# Patient Record
Sex: FEMALE | Race: WHITE | NOT HISPANIC OR LATINO | URBAN - METROPOLITAN AREA
[De-identification: names, ages, dates, MRNs, and addresses within clinical notes are randomized per-mention and may not be internally consistent; named-entity substitution may affect disease eponyms.]

---

## 2021-10-05 ENCOUNTER — TELEPHONE ENCOUNTER (OUTPATIENT)
Dept: URBAN - METROPOLITAN AREA CLINIC 92 | Facility: CLINIC | Age: 86
End: 2021-10-05

## 2021-10-07 ENCOUNTER — OFFICE VISIT (OUTPATIENT)
Dept: URBAN - METROPOLITAN AREA CLINIC 12 | Facility: CLINIC | Age: 86
End: 2021-10-07

## 2023-10-10 ENCOUNTER — LAB OUTSIDE AN ENCOUNTER (OUTPATIENT)
Dept: URBAN - METROPOLITAN AREA CLINIC 23 | Facility: CLINIC | Age: 88
End: 2023-10-10

## 2023-10-10 ENCOUNTER — OFFICE VISIT (OUTPATIENT)
Dept: URBAN - METROPOLITAN AREA CLINIC 23 | Facility: CLINIC | Age: 88
End: 2023-10-10
Payer: COMMERCIAL

## 2023-10-10 VITALS
HEIGHT: 64 IN | BODY MASS INDEX: 19.29 KG/M2 | TEMPERATURE: 98.3 F | HEART RATE: 67 BPM | DIASTOLIC BLOOD PRESSURE: 87 MMHG | WEIGHT: 113 LBS | SYSTOLIC BLOOD PRESSURE: 150 MMHG

## 2023-10-10 DIAGNOSIS — R19.5 POSITIVE COLORECTAL CANCER SCREENING USING DNA-BASED STOOL TEST: ICD-10-CM

## 2023-10-10 DIAGNOSIS — R63.4 ABNORMAL WEIGHT LOSS: ICD-10-CM

## 2023-10-10 DIAGNOSIS — R19.4 CHANGE IN BOWEL HABIT: ICD-10-CM

## 2023-10-10 PROCEDURE — 99204 OFFICE O/P NEW MOD 45 MIN: CPT | Performed by: INTERNAL MEDICINE

## 2023-10-10 RX ORDER — METHOCARBAMOL 500 MG/1
1.5 TABLETS TABLET ORAL
Status: ACTIVE | COMMUNITY

## 2023-10-10 RX ORDER — LEVOTHYROXINE SODIUM 50 UG/1
1 TABLET IN THE MORNING ON AN EMPTY STOMACH TABLET ORAL ONCE A DAY
Status: ACTIVE | COMMUNITY

## 2023-10-10 RX ORDER — ATENOLOL 25 MG/1
1 TABLET TABLET ORAL ONCE A DAY
Status: ACTIVE | COMMUNITY

## 2023-10-10 RX ORDER — MELOXICAM 15 MG/1
1 TABLET TABLET ORAL ONCE A DAY
Status: ACTIVE | COMMUNITY

## 2023-10-10 RX ORDER — AMLODIPINE BESYLATE 5 MG/1
1 TABLET TABLET ORAL ONCE A DAY
Status: ACTIVE | COMMUNITY

## 2023-10-10 NOTE — HPI-TODAY'S VISIT:
88-year-old female presented today for evaluation of abnormal weight loss change in the bowel habit and positive Cologuard test she  reports recent weight loss, constipation, bloating, and a feeling of something in the bowel. She has been taking laxatives and has noticed an increase in the dose required over the past year. She has a family history of colon cancer and has had a positive Cologuard test. The patient denies any anemia or low blood count.she has had a hysterectomy in the past. She occasionally experiences pain on the left side of her abdomen but not consistently. She has undergone various tests for heart disease and reports no history of heart attack or other heart-related issues.

## 2023-10-10 NOTE — PHYSICAL EXAM EYES:
Conjuntivae and eyelids appear normal, Sclerae : White without injection Prescription approved per [facility] Refill Protocol.    Selena Bates RN  [facility]

## 2023-10-11 ENCOUNTER — TELEPHONE ENCOUNTER (OUTPATIENT)
Dept: URBAN - METROPOLITAN AREA CLINIC 23 | Facility: CLINIC | Age: 88
End: 2023-10-11

## 2023-10-23 ENCOUNTER — TELEPHONE ENCOUNTER (OUTPATIENT)
Dept: URBAN - METROPOLITAN AREA CLINIC 23 | Facility: CLINIC | Age: 88
End: 2023-10-23

## 2023-11-03 ENCOUNTER — OFFICE VISIT (OUTPATIENT)
Dept: URBAN - METROPOLITAN AREA MEDICAL CENTER 27 | Facility: MEDICAL CENTER | Age: 88
End: 2023-11-03
Payer: COMMERCIAL

## 2023-11-03 DIAGNOSIS — K63.89 TORTUOUS COLON: ICD-10-CM

## 2023-11-03 DIAGNOSIS — R19.4 ALTERATION IN BOWEL ELIMINATION: ICD-10-CM

## 2023-11-03 DIAGNOSIS — R19.5 ABNORMAL CONSISTENCY OF STOOL: ICD-10-CM

## 2023-11-03 PROCEDURE — 45378 DIAGNOSTIC COLONOSCOPY: CPT | Performed by: INTERNAL MEDICINE

## 2023-11-03 RX ORDER — MELOXICAM 15 MG/1
1 TABLET TABLET ORAL ONCE A DAY
Status: ACTIVE | COMMUNITY

## 2023-11-03 RX ORDER — AMLODIPINE BESYLATE 5 MG/1
1 TABLET TABLET ORAL ONCE A DAY
Status: ACTIVE | COMMUNITY

## 2023-11-03 RX ORDER — METHOCARBAMOL 500 MG/1
1.5 TABLETS TABLET ORAL
Status: ACTIVE | COMMUNITY

## 2023-11-03 RX ORDER — LEVOTHYROXINE SODIUM 50 UG/1
1 TABLET IN THE MORNING ON AN EMPTY STOMACH TABLET ORAL ONCE A DAY
Status: ACTIVE | COMMUNITY

## 2023-11-03 RX ORDER — ATENOLOL 25 MG/1
1 TABLET TABLET ORAL ONCE A DAY
Status: ACTIVE | COMMUNITY

## 2024-06-26 ENCOUNTER — LAB OUTSIDE AN ENCOUNTER (OUTPATIENT)
Dept: URBAN - METROPOLITAN AREA CLINIC 111 | Facility: CLINIC | Age: 89
End: 2024-06-26

## 2024-06-26 ENCOUNTER — DASHBOARD ENCOUNTERS (OUTPATIENT)
Age: 89
End: 2024-06-26

## 2024-06-26 ENCOUNTER — OFFICE VISIT (OUTPATIENT)
Dept: URBAN - METROPOLITAN AREA CLINIC 111 | Facility: CLINIC | Age: 89
End: 2024-06-26
Payer: COMMERCIAL

## 2024-06-26 VITALS
SYSTOLIC BLOOD PRESSURE: 151 MMHG | WEIGHT: 106 LBS | HEART RATE: 73 BPM | HEIGHT: 62 IN | TEMPERATURE: 97.5 F | DIASTOLIC BLOOD PRESSURE: 81 MMHG | BODY MASS INDEX: 19.51 KG/M2

## 2024-06-26 DIAGNOSIS — M54.6 PAIN IN THORACIC SPINE: ICD-10-CM

## 2024-06-26 DIAGNOSIS — R63.4 ABNORMAL WEIGHT LOSS: ICD-10-CM

## 2024-06-26 DIAGNOSIS — G89.29 OTHER CHRONIC PAIN: ICD-10-CM

## 2024-06-26 DIAGNOSIS — R68.81 EARLY SATIETY: ICD-10-CM

## 2024-06-26 PROCEDURE — 99214 OFFICE O/P EST MOD 30 MIN: CPT | Performed by: INTERNAL MEDICINE

## 2024-06-26 RX ORDER — ATORVASTATIN CALCIUM 20 MG/1
1 TABLET TABLET, FILM COATED ORAL ONCE A DAY
Status: ACTIVE | COMMUNITY

## 2024-06-26 RX ORDER — LEVOTHYROXINE SODIUM 50 UG/1
1 TABLET IN THE MORNING ON AN EMPTY STOMACH TABLET ORAL ONCE A DAY
Status: ACTIVE | COMMUNITY

## 2024-06-26 RX ORDER — METHOCARBAMOL 500 MG/1
1.5 TABLETS TABLET ORAL
Status: ACTIVE | COMMUNITY

## 2024-06-26 RX ORDER — AMLODIPINE BESYLATE 5 MG/1
1 TABLET TABLET ORAL ONCE A DAY
Status: ACTIVE | COMMUNITY

## 2024-06-26 RX ORDER — CLONIDINE 0.1 MG/D
1 PATCH TO SKIN PATCH TRANSDERMAL
Status: ACTIVE | COMMUNITY

## 2024-06-26 RX ORDER — GABAPENTIN 100 MG/1
1 CAPSULE CAPSULE ORAL ONCE A DAY
Status: ACTIVE | COMMUNITY

## 2024-06-26 RX ORDER — ATENOLOL 25 MG/1
1 TABLET TABLET ORAL ONCE A DAY
Status: ACTIVE | COMMUNITY

## 2024-06-26 RX ORDER — MELOXICAM 15 MG/1
1 TABLET TABLET ORAL ONCE A DAY
Status: ON HOLD | COMMUNITY

## 2024-06-26 NOTE — HPI-TODAY'S VISIT:
89-year-old female presented today for evaluation of abnormal weight loss change  Her main complaint was progressive back pain which is a chronic but recently worsened associated with the weight loss she had PET scan showed some sclerosis abnormality in the ribs on the back she was seen by oncology had extensive workup was told no cancer still no clear cause for her weight loss.  She had Cologuard positive test and she had a colonoscopy November 2023 which was unremarkable except for tortuous colon.  She continued to lose weight loss of appetite and back pain she describes the pain as a stabbing pain worse with the movement. All her labs were normal   she had  significant weight loss, with the patient's weight dropping from 120 lbs to 104 lbs despite efforts to maintain weight. She reports a decreased appetite but continues to consume three meals daily, supplementing her diet with protein bars and drinks to address low protein levels.  The patient's procedural history includes a recent colonoscopy, which showed no abnormalities. Additional diagnostic tests, such as a PET scan and CT scan, were performed and did not reveal any masses or cancer. However, an area of bone abnormality was noted in the second rib, though no definitive cause for the pain was identified. Her blood tests, including liver function tests, are within normal limits.  She also experiences nausea and occasional feelings of needing to vomit but has not actually vomited. She reports no issues with food getting stuck and is able to eat without difficulty.

## 2024-06-27 PROBLEM — 82423001: Status: ACTIVE | Noted: 2024-06-27

## 2024-07-23 ENCOUNTER — OFFICE VISIT (OUTPATIENT)
Dept: URBAN - METROPOLITAN AREA MEDICAL CENTER 27 | Facility: MEDICAL CENTER | Age: 89
End: 2024-07-23
Payer: COMMERCIAL

## 2024-07-23 DIAGNOSIS — K31.89 OTHER DISEASES OF STOMACH AND DUODENUM: ICD-10-CM

## 2024-07-23 DIAGNOSIS — K29.60 ADENOPAPILLOMATOSIS GASTRICA: ICD-10-CM

## 2024-07-23 PROCEDURE — 43239 EGD BIOPSY SINGLE/MULTIPLE: CPT | Performed by: INTERNAL MEDICINE

## 2024-07-23 RX ORDER — CLONIDINE 0.1 MG/D
1 PATCH TO SKIN PATCH TRANSDERMAL
Status: ACTIVE | COMMUNITY

## 2024-07-23 RX ORDER — MELOXICAM 15 MG/1
1 TABLET TABLET ORAL ONCE A DAY
Status: ON HOLD | COMMUNITY

## 2024-07-23 RX ORDER — GABAPENTIN 100 MG/1
1 CAPSULE CAPSULE ORAL ONCE A DAY
Status: ACTIVE | COMMUNITY

## 2024-07-23 RX ORDER — LEVOTHYROXINE SODIUM 50 UG/1
1 TABLET IN THE MORNING ON AN EMPTY STOMACH TABLET ORAL ONCE A DAY
Status: ACTIVE | COMMUNITY

## 2024-07-23 RX ORDER — ATORVASTATIN CALCIUM 20 MG/1
1 TABLET TABLET, FILM COATED ORAL ONCE A DAY
Status: ACTIVE | COMMUNITY

## 2024-07-23 RX ORDER — ATENOLOL 25 MG/1
1 TABLET TABLET ORAL ONCE A DAY
Status: ACTIVE | COMMUNITY

## 2024-07-23 RX ORDER — METHOCARBAMOL 500 MG/1
1.5 TABLETS TABLET ORAL
Status: ACTIVE | COMMUNITY

## 2024-07-23 RX ORDER — AMLODIPINE BESYLATE 5 MG/1
1 TABLET TABLET ORAL ONCE A DAY
Status: ACTIVE | COMMUNITY

## 2024-08-06 ENCOUNTER — LAB OUTSIDE AN ENCOUNTER (OUTPATIENT)
Dept: URBAN - METROPOLITAN AREA CLINIC 12 | Facility: CLINIC | Age: 89
End: 2024-08-06

## 2024-08-06 ENCOUNTER — OFFICE VISIT (OUTPATIENT)
Dept: URBAN - METROPOLITAN AREA CLINIC 12 | Facility: CLINIC | Age: 89
End: 2024-08-06
Payer: COMMERCIAL

## 2024-08-06 VITALS
BODY MASS INDEX: 19.58 KG/M2 | WEIGHT: 106.4 LBS | TEMPERATURE: 97.7 F | SYSTOLIC BLOOD PRESSURE: 114 MMHG | DIASTOLIC BLOOD PRESSURE: 70 MMHG | HEIGHT: 62 IN | HEART RATE: 69 BPM

## 2024-08-06 DIAGNOSIS — R68.81 EARLY SATIETY: ICD-10-CM

## 2024-08-06 DIAGNOSIS — G89.29 OTHER CHRONIC PAIN: ICD-10-CM

## 2024-08-06 DIAGNOSIS — M54.6 PAIN IN THORACIC SPINE: ICD-10-CM

## 2024-08-06 DIAGNOSIS — R63.4 ABNORMAL WEIGHT LOSS: ICD-10-CM

## 2024-08-06 PROBLEM — 442076002: Status: ACTIVE | Noted: 2024-08-06

## 2024-08-06 PROBLEM — 267024001: Status: ACTIVE | Noted: 2024-08-06

## 2024-08-06 PROBLEM — 136791000119103: Status: ACTIVE | Noted: 2024-08-06

## 2024-08-06 PROCEDURE — 99213 OFFICE O/P EST LOW 20 MIN: CPT

## 2024-08-06 RX ORDER — METHOCARBAMOL 500 MG/1
1.5 TABLETS TABLET ORAL
Status: ACTIVE | COMMUNITY

## 2024-08-06 RX ORDER — GABAPENTIN 100 MG/1
1 CAPSULE CAPSULE ORAL ONCE A DAY
Status: ACTIVE | COMMUNITY

## 2024-08-06 RX ORDER — LEVOTHYROXINE SODIUM 50 UG/1
1 TABLET IN THE MORNING ON AN EMPTY STOMACH TABLET ORAL ONCE A DAY
Status: ACTIVE | COMMUNITY

## 2024-08-06 RX ORDER — ATORVASTATIN CALCIUM 20 MG/1
1 TABLET TABLET, FILM COATED ORAL ONCE A DAY
Status: ACTIVE | COMMUNITY

## 2024-08-06 RX ORDER — ATENOLOL 25 MG/1
1 TABLET TABLET ORAL ONCE A DAY
Status: ACTIVE | COMMUNITY

## 2024-08-06 RX ORDER — AMLODIPINE BESYLATE 5 MG/1
1 TABLET TABLET ORAL ONCE A DAY
Status: ACTIVE | COMMUNITY

## 2024-08-06 RX ORDER — CLONIDINE 0.1 MG/D
1 PATCH TO SKIN PATCH TRANSDERMAL
Status: ACTIVE | COMMUNITY

## 2024-08-06 NOTE — HPI-TODAY'S VISIT:
Pt is a 88 yo female presents today for a f/u after an EGD. She saw Dr. Whaley on 06/2024 for weight loss. S/p diagnostic tests including PET scan and CT scan without any significant findings for mass/cancer. Blood tests WNL. She had colon with Dr. Juarez in 11/3/23 which showed moderately tortuous colon, divertiulcosis in the sigmoid colon and hemorrhoids. EGD on 7/23/24: gastritis. Bx negative for H. pylori or intestinal metaplasia. Today pt reports decreased appetite but continues to try to consume 2000 calories a day per PCP recommendation. Her weight has been stable around 106 lbs for a few weeks. C/o intermittent nausea but no vomting. Has early sateity. Has not tried medication to help with appetite. Denies dysphagia, odynophagia, reflux, heartburn, epigastric pain or melena. Denies rectal pain, rectal bleeding, or change in bowel habits. Takes Magnesium prn for constipation with good symptom control. Believes her low appetite is from her chronic back pain. States she had seen a pain management in the past and had 3 injections without any symptom relief. Takes Tylenol, Tramadol and Methocarbamol with some relief.

## 2024-08-06 NOTE — PHYSICAL EXAM GASTROINTESTINAL
Abdomen , soft, nontender, nondistended , no guarding or rigidity , no masses palpable,  no hepatosplenomegaly , liver nontender

## 2024-08-27 ENCOUNTER — TELEPHONE ENCOUNTER (OUTPATIENT)
Dept: URBAN - METROPOLITAN AREA CLINIC 12 | Facility: CLINIC | Age: 89
End: 2024-08-27